# Patient Record
Sex: FEMALE | Race: WHITE | NOT HISPANIC OR LATINO | URBAN - METROPOLITAN AREA
[De-identification: names, ages, dates, MRNs, and addresses within clinical notes are randomized per-mention and may not be internally consistent; named-entity substitution may affect disease eponyms.]

---

## 2024-11-12 ENCOUNTER — OFFICE VISIT (OUTPATIENT)
Dept: URBAN - METROPOLITAN AREA CLINIC 128 | Facility: CLINIC | Age: 59
End: 2024-11-12
Payer: SELF-PAY

## 2024-11-12 ENCOUNTER — DASHBOARD ENCOUNTERS (OUTPATIENT)
Age: 59
End: 2024-11-12

## 2024-11-12 VITALS
WEIGHT: 156.6 LBS | DIASTOLIC BLOOD PRESSURE: 86 MMHG | HEIGHT: 64 IN | BODY MASS INDEX: 26.73 KG/M2 | SYSTOLIC BLOOD PRESSURE: 124 MMHG | TEMPERATURE: 97.9 F | OXYGEN SATURATION: 99 % | HEART RATE: 81 BPM

## 2024-11-12 DIAGNOSIS — Z12.11 SCREENING FOR MALIGNANT NEOPLASM OF COLON: ICD-10-CM

## 2024-11-12 PROCEDURE — 99203 OFFICE O/P NEW LOW 30 MIN: CPT | Performed by: STUDENT IN AN ORGANIZED HEALTH CARE EDUCATION/TRAINING PROGRAM

## 2024-11-12 RX ORDER — METHIMAZOLE 5 MG/1
1 TABLET TABLET ORAL ONCE A DAY
Status: ACTIVE | COMMUNITY

## 2024-11-12 NOTE — HPI-TODAY'S VISIT:
2024:  Sushma: Pt is a 60 yo  F who presents for evaluation for colon cancer screening.  Symptoms:  Pt denies abdominal pain, change in stool habits, change in stool caliber, unintentional weight loss, hematochezia or other new symptoms in the last 6-12 months.  OAC/APT use:  Denies exposure to NSAIDs and not on any OAC/APT.  Substance use history: Former smoker who quit      ago.  Social use of alcohol.  Denies use of OTC or herbal supplements or any recreational drugs.  FH:  No known h/o CRCs, colon polyps in family members. Father  from pancreatic cancer.  Abdominal surgeries:  None. Last C-scope: Never

## 2025-01-10 ENCOUNTER — OFFICE VISIT (OUTPATIENT)
Dept: URBAN - METROPOLITAN AREA SURGERY CENTER 31 | Facility: SURGERY CENTER | Age: 60
End: 2025-01-10

## 2025-02-28 ENCOUNTER — OFFICE VISIT (OUTPATIENT)
Dept: URBAN - METROPOLITAN AREA SURGERY CENTER 31 | Facility: SURGERY CENTER | Age: 60
End: 2025-02-28

## 2025-02-28 RX ORDER — METHIMAZOLE 5 MG/1
1 TABLET TABLET ORAL ONCE A DAY
Status: ACTIVE | COMMUNITY